# Patient Record
Sex: MALE | Race: WHITE
[De-identification: names, ages, dates, MRNs, and addresses within clinical notes are randomized per-mention and may not be internally consistent; named-entity substitution may affect disease eponyms.]

---

## 2017-08-17 ENCOUNTER — HOSPITAL ENCOUNTER (EMERGENCY)
Dept: HOSPITAL 45 - C.EDB | Age: 52
LOS: 1 days | Discharge: HOME | End: 2017-08-18
Payer: COMMERCIAL

## 2017-08-17 VITALS — TEMPERATURE: 98.06 F

## 2017-08-17 VITALS
BODY MASS INDEX: 24.6 KG/M2 | HEIGHT: 70.98 IN | WEIGHT: 175.71 LBS | HEIGHT: 70.98 IN | BODY MASS INDEX: 24.6 KG/M2 | WEIGHT: 175.71 LBS

## 2017-08-17 DIAGNOSIS — Z87.440: ICD-10-CM

## 2017-08-17 DIAGNOSIS — Z84.1: ICD-10-CM

## 2017-08-17 DIAGNOSIS — I10: ICD-10-CM

## 2017-08-17 DIAGNOSIS — Z79.899: ICD-10-CM

## 2017-08-17 DIAGNOSIS — Z80.9: ICD-10-CM

## 2017-08-17 DIAGNOSIS — Z82.49: ICD-10-CM

## 2017-08-17 DIAGNOSIS — N20.1: Primary | ICD-10-CM

## 2017-08-17 DIAGNOSIS — Z88.2: ICD-10-CM

## 2017-08-18 VITALS — DIASTOLIC BLOOD PRESSURE: 78 MMHG | SYSTOLIC BLOOD PRESSURE: 122 MMHG | HEART RATE: 74 BPM | OXYGEN SATURATION: 98 %

## 2017-08-18 LAB
ANION GAP SERPL CALC-SCNC: 3 MMOL/L (ref 3–11)
APPEARANCE UR: CLEAR
BASOPHILS # BLD: 0.04 K/UL (ref 0–0.2)
BASOPHILS NFR BLD: 0.3 %
BILIRUB UR-MCNC: (no result) MG/DL
BUN SERPL-MCNC: 27 MG/DL (ref 7–18)
BUN/CREAT SERPL: 22.8 (ref 10–20)
CALCIUM SERPL-MCNC: 8.9 MG/DL (ref 8.5–10.1)
CHLORIDE SERPL-SCNC: 107 MMOL/L (ref 98–107)
CO2 SERPL-SCNC: 31 MMOL/L (ref 21–32)
COLOR UR: (no result)
COMPLETE: YES
CREAT CL PREDICTED SERPL C-G-VRATE: 78.4 ML/MIN
CREAT SERPL-MCNC: 1.2 MG/DL (ref 0.6–1.4)
EOSINOPHIL NFR BLD AUTO: 271 K/UL (ref 130–400)
GLUCOSE SERPL-MCNC: 99 MG/DL (ref 70–99)
HCT VFR BLD CALC: 45.2 % (ref 42–52)
IG%: 0.2 %
IMM GRANULOCYTES NFR BLD AUTO: 17.3 %
LYMPHOCYTES # BLD: 2.13 K/UL (ref 1.2–3.4)
MANUAL MICROSCOPIC REQUIRED?: NO
MCH RBC QN AUTO: 31.5 PG (ref 25–34)
MCHC RBC AUTO-ENTMCNC: 33.8 G/DL (ref 32–36)
MCV RBC AUTO: 93.2 FL (ref 80–100)
MONOCYTES NFR BLD: 6.3 %
MUCOUS THREADS URNS QL MICRO: PRESENT
NEUTROPHILS # BLD AUTO: 0.7 %
NEUTROPHILS NFR BLD AUTO: 75.2 %
NITRITE UR QL STRIP: (no result)
PH UR STRIP: 6 [PH] (ref 4.5–7.5)
PMV BLD AUTO: 10.8 FL (ref 7.4–10.4)
POTASSIUM SERPL-SCNC: 3.5 MMOL/L (ref 3.5–5.1)
RBC # BLD AUTO: 4.85 M/UL (ref 4.7–6.1)
REVIEW REQ?: YES
SODIUM SERPL-SCNC: 141 MMOL/L (ref 136–145)
SP GR UR STRIP: 1.02 (ref 1–1.03)
URINE BILL WITH OR WITHOUT MIC: (no result)
UROBILINOGEN UR-MCNC: (no result) MG/DL
WBC # BLD AUTO: 12.3 K/UL (ref 4.8–10.8)

## 2017-08-18 NOTE — EMERGENCY ROOM VISIT NOTE
History


Report prepared by Brady:  Stephane Modi


Under the Supervision of:  Dr. Rama Enamorado D.O.


First contact with patient:  23:58


Chief Complaint:  KIDNEY STONE


Stated Complaint:  KIDNEY STONE





History of Present Illness


The patient is a 52 year old male who presents to the Emergency Room with 

complaints of constant right flank pain beginning prior to arrival. He 

currently rates his discomfort a 9/10 in severity. The patient states that he 

was getting ready to lay down to sleep, and his pain suddenly started. He 

reports that he has a history of one kidney stone, and he passed it before 

needing surgery. The patient notes that it is on the same side as before, and 

his symptoms are very similar. He states that he has not had any changes in his 

diet or lifestyle over the past few days. The patient reports that he has not 

been able to urinate since, and he almost vomited, but he he did not. He notes 

that he took one hydrocodone pill for pain. The patient states that he has no 

other medical problems other than hypertension.





   Source of History:  patient


   Onset:  prior to arrival


   Position:  other (right flank)


   Symptom Intensity:  9/10


   Timing:  constant


   Associated Symptoms:  + urinary symptoms (decrease urinary frequency), No 

vomiting





Review of Systems


See HPI for pertinent positives & negatives. A total of 10 systems reviewed and 

were otherwise negative.





Past Medical & Surgical


Medical Problems:


(1) HTN (hypertension)


(2) Kidney stone








Family History





Cancer


Heart disease


Hypertension


Kidney disease


Kidney stones





Social History


Smoking Status:  Never Smoker


Smokeless Tobacco Use:  No


Alcohol Use:  none


Marital Status:  


Housing Status:  lives with significant other


Occupation Status:  employed





Current/Historical Medications


Scheduled


Cetirizine (Zyrtec), 10 MG PO DAILY


Fexofenadine HCl (Allergy 24-Hr), 180 MG PO DAILY


Lisinopril (Prinivil), 10 MG PO DAILY


Terazosin (Hytrin), 5 MG PO DAILY


Triamcinolone Acetonide (Nasal (Nasacort Allergy 24Hr), 1 SPRAY NA DAILY





Scheduled PRN


Omeprazole (Prilosec), 20 MG PO DAILY PRN for Heartburn


Oxycodone/Acetaminophen 5MG/325MG (Percocet 5MG/325MG), 1-2 TABLETS PO Q4H PRN 

for Pain





Allergies


Coded Allergies:  


     Sulfamethoxazole w/Trimethoprim (Verified  Allergy, Intermediate, HIVES, 8/ 17/17)





Physical Exam


Vital Signs











  Date Time  Temp Pulse Resp B/P (MAP) Pulse Ox O2 Delivery O2 Flow Rate FiO2


 


8/18/17 02:20  74 18 122/78 98   


 


8/18/17 01:13  66 18 124/84 96 Room Air  


 


8/17/17 23:35 36.7 72 16 147/99 99 Room Air  











Physical Exam


HEENT: Head - normocephalic and atraumatic   Pupils are equal, round, and 

reactive to light.  Extraocular eye muscles are intact, and sclera are 

anicteric.   Nose -  moist nasal mucosa without discharge. Mouth - moist buccal 

mucosa.  Oropharynx is nonerythematous and there is no tonsillar exudate or 

edema noted.


Neck: Supple; no JVD, nuchal rigidity, cervical lymphadenopathy.


Heart: Regular rate and rhythm.  There is a normal S1 and S2 with no murmurs, 

clicks, or gallops appreciated.


Lungs: Clear to auscultation bilaterally with no wheezes, rales, or rhonchi.


Abdomen: Soft, completely nontender, nondistended, with good bowel sounds.  

There are no palpable pulsatile masses or hepatosplenomegaly.  There is no 

guarding, rigidity, or rebound noted.


Back: Erythema over right back and flank where he was rubbing that area


Extremities: No evidence of cyanosis, clubbing, or edema.  There are easily 

palpable peripheral pulses.


Skin: Pale and diaphoretic with good turgor





Medical Decision & Procedures


ER Provider


Diagnostic Interpretation:


CT results as stated below per my review and radiologist interpretation: 





CT ABDOMEN & PELVIS:





Approximate 5 mm stone at the proximal right ureter without appreciable 

postobstructive change at this time. Additional punctate nonobstructing stone 

in the left kidney.


Remainder of noncontrast study shows no definite evidence for an additional 

acute inflammatory process.





Radiologist: Mando Shipman MD      Phone: [phone]





Study ready at 0052 and initial results transmitted at 0120





Laboratory Results


8/18/17 00:15








Red Blood Count 4.85, Mean Corpuscular Volume 93.2, Mean Corpuscular Hemoglobin 

31.5, Mean Corpuscular Hemoglobin Concent 33.8, Mean Platelet Volume 10.8, 

Neutrophils (%) (Auto) 75.2, Lymphocytes (%) (Auto) 17.3, Monocytes (%) (Auto) 

6.3, Eosinophils (%) (Auto) 0.7, Basophils (%) (Auto) 0.3, Neutrophils # (Auto) 

9.24, Lymphocytes # (Auto) 2.13, Monocytes # (Auto) 0.77, Eosinophils # (Auto) 

0.09, Basophils # (Auto) 0.04





8/18/17 00:15

















Test


  8/18/17


00:15 8/18/17


01:30


 


White Blood Count


  12.30 K/uL


(4.8-10.8) 


 


 


Red Blood Count


  4.85 M/uL


(4.7-6.1) 


 


 


Hemoglobin


  15.3 g/dL


(14.0-18.0) 


 


 


Hematocrit 45.2 % (42-52)  


 


Mean Corpuscular Volume


  93.2 fL


() 


 


 


Mean Corpuscular Hemoglobin


  31.5 pg


(25-34) 


 


 


Mean Corpuscular Hemoglobin


Concent 33.8 g/dl


(32-36) 


 


 


Platelet Count


  271 K/uL


(130-400) 


 


 


Mean Platelet Volume


  10.8 fL


(7.4-10.4) 


 


 


Neutrophils (%) (Auto) 75.2 %  


 


Lymphocytes (%) (Auto) 17.3 %  


 


Monocytes (%) (Auto) 6.3 %  


 


Eosinophils (%) (Auto) 0.7 %  


 


Basophils (%) (Auto) 0.3 %  


 


Neutrophils # (Auto)


  9.24 K/uL


(1.4-6.5) 


 


 


Lymphocytes # (Auto)


  2.13 K/uL


(1.2-3.4) 


 


 


Monocytes # (Auto)


  0.77 K/uL


(0.11-0.59) 


 


 


Eosinophils # (Auto)


  0.09 K/uL


(0-0.5) 


 


 


Basophils # (Auto)


  0.04 K/uL


(0-0.2) 


 


 


RDW Standard Deviation


  42.4 fL


(36.4-46.3) 


 


 


RDW Coefficient of Variation


  12.5 %


(11.5-14.5) 


 


 


Immature Granulocyte % (Auto) 0.2 %  


 


Immature Granulocyte # (Auto)


  0.03 K/uL


(0.00-0.02) 


 


 


Anion Gap


  3.0 mmol/L


(3-11) 


 


 


Est Creatinine Clear Calc


Drug Dose 78.4 ml/min 


  


 


 


Estimated GFR (


American) 81.2 


  


 


 


Estimated GFR (Non-


American 70.1 


  


 


 


BUN/Creatinine Ratio 22.8 (10-20)  


 


Calcium Level


  8.9 mg/dl


(8.5-10.1) 


 


 


Urine Color  DK YELLOW 


 


Urine Appearance  CLEAR (CLEAR) 


 


Urine pH  6.0 (4.5-7.5) 


 


Urine Specific Gravity


  


  1.025


(1.000-1.030)


 


Urine Protein  1+ (NEG) 


 


Urine Glucose (UA)  NEG (NEG) 


 


Urine Ketones  TRACE (NEG) 


 


Urine Occult Blood  3+ (NEG) 


 


Urine Nitrite  NEG (NEG) 


 


Urine Bilirubin  NEG (NEG) 


 


Urine Urobilinogen  NEG (NEG) 


 


Urine Leukocyte Esterase  TRACE (NEG) 


 


Urine WBC (Auto)


  


  5-10 /hpf


(0-5)


 


Urine RBC (Auto)


  


  10-30 /hpf


(0-4)


 


Urine Hyaline Casts (Auto)  1-5 /lpf (0-5) 


 


Urine Epithelial Cells (Auto)


  


  10-20 /lpf


(0-5)


 


Urine Bacteria (Auto)  NEG (NEG) 


 


Urine Crystals


  


  CALCIUM


OXALATE (NONE


 


Urine Mucus


  


  PRESENT (NONE


PRSENT)


 


Urine Yeast (Auto)   (NONE PRSENT) 





Laboratory results per my review.





Medications Administered











 Medications


  (Trade)  Dose


 Ordered  Sig/María


 Route  Start Time


 Stop Time Status Last Admin


Dose Admin


 


 Morphine Sulfate


  (MoRPHine


 SULFATE INJ)  10 mg  STK-MED ONCE


 .ROUTE  8/18/17 00:09


 8/18/17 00:10 DC 8/18/17 00:20


4 MG


 


 Ondansetron HCl


  (Zofran Inj)  4 mg  STK-MED ONCE


 .ROUTE  8/18/17 00:09


 8/18/17 00:10 DC 8/18/17 00:20


4 MG


 


 Ketorolac


 Tromethamine


  (Toradol Inj)  30 mg  STK-MED ONCE


 .ROUTE  8/18/17 00:09


 8/18/17 00:10 DC 8/18/17 00:20


30 MG


 


 Sodium Chloride  1,000 ml @ 


 999 mls/hr  Q1H1M STAT


 IV  8/18/17 00:10


 8/18/17 01:10 DC 8/18/17 00:30


999 MLS/HR


 


 Sodium Chloride  1,000 ml @ 


 999 mls/hr  Q1H1M STAT


 IV  8/18/17 01:29


 8/18/17 02:29 DC 8/18/17 01:29


999 MLS/HR


 


 Oxycodone/


 Acetaminophen


  (Percocet 5/


 325MG Home Pack)  1 homepack  UD  ONCE


 PO  8/18/17 01:45


 8/18/17 01:46 DC 8/18/17 02:15


1 HOMEPACK











Procedure


0009: Ordered Toradol Inj 30 mg .ROUTE, Zofran Inj 4mg .ROUTE, Morphine Sulfate 

10mg .ROUTE





0010: Ordered Sodium Chloride 1000 ml @ 999 mls/hr IV. 





0129: Ordered Sodium Chloride 1000 ml @ 999 mls/hr IV. 





0145: Ordered Oxycodone/Acetaminophen 1 homepack PO





ED Course


0006: The patient was evaluated in room C12B. A complete history and physical 

examination were performed. Nursing notes and previous electronic medical 

records were reviewed.  IV lock was established and labs were drawn as above.





0009: Ordered Toradol Inj 30 mg .IV, Zofran Inj 4mg IV, Morphine Sulfate 4mg IV





0010: Ordered Sodium Chloride 1000 ml @ 999 mls/hr IV. 





0051: I reevaluated the patient, and he is feeling comfortable after the 

medications. He just got back from his CT scan.





0129: Ordered Sodium Chloride 1000 ml @ 999 mls/hr IV. 





0131: Upon reevaluation, I had a long conversation with the patient. I 

discussed findings and results with him. The patient verbalized agreement of 

the treatment plan. He will be discharged home when he receives his homepack.





0145: Ordered Oxycodone/Acetaminophen 1 homepack PO





Medical Decision


The patient is a 52 year old male who presents to the ED with right flank pain. 

Differential diagnosis includes pyelonephritis, ureteral colic, herpes zoster, 

back strain.





Lab results show: WBC of 12.3, stable H&H, BUN of 27, creatinine of 1.2, 

glucose of 99. Urinalysis: urine is dark yellow, trace ketones, 3+ blood, 

calcium oxalate crystals present, 10-30 red blood cells, 5-10 white blood cells

, trace leukocyte esterase.





The patient presents with sudden onset of right flank pain.  He has a history 

of previous kidney stones in 2006.  Urinalysis shows moderate blood within the 

urine.  CT scan confirms a right-sided 5 mm proximal right ureteral stone.  

There is no obvious significant obstructive changes.  I reviewed the spines 

with the patient he is currently comfortable and will be discharged home.  He 

has a primary urologist-Dr. Bonilla.  He will follow up with her by Monday if 

the pain persists.  He was given a prescription for Percocet to use for pain.  

It should be noted that the patient previously passed a 6 mm stone through that 

ureter.





The patient was instructed to return to the emergency department if he develops 

unbearable pain, vomiting, or fever.





[place] Drug Monitoring Program


Search Results:  patient reviewed within database, no issues identified





Impression





 Primary Impression:  


 Right ureteral calculus





Scribe Attestation


The scribe's documentation has been prepared under my direction and personally 

reviewed by me in its entirety. I confirm that the note above accurately 

reflects all work, treatment, procedures, and medical decision making performed 

by me.





Departure Information


Dispostion


Home / Self-Care





Prescriptions





Oxycodone/Acetaminophen 5MG/325MG (PERCOCET 5MG/325MG)  Tab


1-2 TABLETS PO Q4H Y for Pain, #20 TAB


   Prov: Rama Enamorado D.O.         8/18/17





Referrals


Osman Zavala M.D. (PCP)





Forms


HOME CARE DOCUMENTATION FORM,                                                 

               IMPORTANT VISIT INFORMATION





Patient Instructions


Kidney Stones, Kidney Stones Expectant Therapy, My [facility]





Additional Instructions





Rest.





Take a bland diet and plenty of clear liquids





Percocet - 1- 2 tabs. every 4-6 hours for pain.





Return to the ER for unbearable pain, vomiting, or fever.





Follow up on Monday with Dr. Bonilla if pain persists

## 2017-08-18 NOTE — DIAGNOSTIC IMAGING REPORT
CT SCAN OF THE ABDOMEN AND PELVIS WITHOUT CONTRAST



CLINICAL HISTORY: Right flank pain    



COMPARISON STUDY:  4/26/2006 



TECHNIQUE: CT scan of the abdomen and pelvis was performed from the lung bases

to the proximal femurs. Images are reviewed in the axial, sagittal, and coronal

planes. IV contrast was not administered for this examination.  A dose lowering

technique was utilized adhering to the principles of ALARA.





CT DOSE: 918.95 mGycm

FINDINGS:



Lower chest: There are bibasal atelectatic changes



Liver: The unenhanced liver is normal in size, contour, and attenuation. There

is no intrahepatic biliary ductal dilatation.



Gallbladder: Unremarkable.



Spleen: Normal in size and attenuation.



Pancreas: Unremarkable.



Adrenal glands: Unremarkable.



Kidneys: There is a nonobstructing 3 mm left renal calculus. No right renal

calculi are visualized. There is a 7 x 4 x 4 mm proximal right ureteral calculus

with minor secondary obstructive changes.



Bowel: There are no transition zones indicate bowel obstruction. There is no

acute diverticulitis. There is no acute appendicitis.



Peritoneum: There is no intraperitoneal free air or abdominal ascites. There are

small fat-containing inguinal hernias.



Vasculature: The abdominal aorta is normal in course and caliber.



Adenopathy: None.



Pelvic viscera: The bladder, and pelvic viscera are unremarkable. There are

penile calcifications.



Skeletal structures: No destructive osseous lesions are seen.



IMPRESSION:  

1. 7 x 4 x 4 mm proximal right ureteral calculus with minor secondary

obstructive changes

2. Left-sided nephrolithiasis

3. No evidence of bowel obstruction. No evidence of free air







Electronically signed by:  Wero Tamayo M.D.

8/18/2017 6:38 AM



Dictated Date/Time:  8/18/2017 6:35 AM

## 2018-08-10 ENCOUNTER — HOSPITAL ENCOUNTER (OUTPATIENT)
Dept: HOSPITAL 45 - C.MRI | Age: 53
Discharge: HOME | End: 2018-08-10
Attending: NURSE PRACTITIONER
Payer: COMMERCIAL

## 2018-08-10 DIAGNOSIS — M79.669: Primary | ICD-10-CM
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
## 2018-08-10 DIAGNOSIS — S33.5XXA: ICD-10-CM

## 2018-08-10 NOTE — DIAGNOSTIC IMAGING REPORT
MRI OF THE LUMBAR SPINE WITHOUT CONTRAST



CLINICAL HISTORY: Low back pain radiating into right hip and groin. Low back

sprain.



COMPARISON STUDY:  No previous studies for comparison. 



TECHNIQUE:  Utilizing a 1.5 Yelitza magnet and dedicated coil, multiplanar,

multiecho imaging of the lumbar spine was performed without IV contrast.





FINDINGS:



For purposes of numbering on this exam, the L5-S1 disc space is assigned to

axial image 25. Alignment of the lumbar spine is anatomic. Vertebral body

heights are maintained. The conus terminates at the lower L1 level. Note is made

of a probable punctate 2 mm round intradural extramedullary lesion along the

nerve roots at the L1-L2 level. No additional intracanalicular lesions are

noted. Paravertebral soft tissues are unremarkable.



L1-2: The central canal and neural foramen are patent.



L2-3: The central canal and neural foramen are patent.



L3-4: There is mild disc space narrowing. The central canal and neural foramen

are patent.



L4-5: There is minimal disc space narrowing. There is minimal disc bulge with

tiny central annular tear. Central canal and neural foramen are patent.



L5-S1: Note is made of mild disc space narrowing with a central/right

paracentral disc protrusion which is small in size. This results in mild

narrowing of the right lateral recess. The neural foramen are patent.





IMPRESSION: 



1. Small central/right paracentral disc protrusion at L5-S1 that results in mild

narrowing of the right lateral recess.



2. Minimal disc bulge at L4-L5. No central canal stenosis.



3. Probable tiny 2 mm intradural extramedullary lesion along the nerve roots at

the L1-L2 level. This favors a tiny schwannoma or meningioma and is of doubtful

significance. A follow-up MRI could be obtained in one year to ensure stability.







Electronically signed by:  Henry Campbell M.D.

8/10/2018 9:05 PM



Dictated Date/Time:  8/10/2018 8:59 PM